# Patient Record
Sex: FEMALE | Race: OTHER | Employment: STUDENT | URBAN - METROPOLITAN AREA
[De-identification: names, ages, dates, MRNs, and addresses within clinical notes are randomized per-mention and may not be internally consistent; named-entity substitution may affect disease eponyms.]

---
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
## 2024-03-27 ENCOUNTER — TELEPHONE (OUTPATIENT)
Dept: FAMILY MEDICINE CLINIC | Age: 19
End: 2024-03-27

## 2024-03-28 ENCOUNTER — HOSPITAL ENCOUNTER (OUTPATIENT)
Dept: GENERAL RADIOLOGY | Age: 19
Discharge: HOME | End: 2024-03-28
Payer: COMMERCIAL

## 2024-03-28 ENCOUNTER — OFFICE VISIT (OUTPATIENT)
Dept: FAMILY MEDICINE CLINIC | Age: 19
End: 2024-03-28
Payer: COMMERCIAL

## 2024-03-28 VITALS
DIASTOLIC BLOOD PRESSURE: 78 MMHG | OXYGEN SATURATION: 98 % | SYSTOLIC BLOOD PRESSURE: 112 MMHG | WEIGHT: 220 LBS | HEART RATE: 96 BPM | BODY MASS INDEX: 36.61 KG/M2

## 2024-03-28 DIAGNOSIS — S93.401A SPRAIN OF RIGHT ANKLE, UNSPECIFIED LIGAMENT, INITIAL ENCOUNTER: ICD-10-CM

## 2024-03-28 DIAGNOSIS — M25.471 RIGHT ANKLE SWELLING: ICD-10-CM

## 2024-03-28 DIAGNOSIS — S93.401A SPRAIN OF RIGHT ANKLE, UNSPECIFIED LIGAMENT, INITIAL ENCOUNTER: Primary | ICD-10-CM

## 2024-03-28 PROCEDURE — 99213 OFFICE O/P EST LOW 20 MIN: CPT | Performed by: NURSE PRACTITIONER

## 2024-03-28 PROCEDURE — 73610 X-RAY EXAM OF ANKLE: CPT

## 2024-03-28 RX ORDER — IBUPROFEN 800 MG/1
800 TABLET ORAL EVERY 8 HOURS PRN
Qty: 21 TABLET | Refills: 0 | Status: SHIPPED | OUTPATIENT
Start: 2024-03-28 | End: 2024-04-04

## 2024-03-28 ASSESSMENT — PATIENT HEALTH QUESTIONNAIRE - PHQ9
1. LITTLE INTEREST OR PLEASURE IN DOING THINGS: NOT AT ALL
SUM OF ALL RESPONSES TO PHQ QUESTIONS 1-9: 0
SUM OF ALL RESPONSES TO PHQ QUESTIONS 1-9: 0
2. FEELING DOWN, DEPRESSED OR HOPELESS: NOT AT ALL
SUM OF ALL RESPONSES TO PHQ QUESTIONS 1-9: 0
DEPRESSION UNABLE TO ASSESS: FUNCTIONAL CAPACITY MOTIVATION LIMITS ACCURACY
SUM OF ALL RESPONSES TO PHQ9 QUESTIONS 1 & 2: 0
SUM OF ALL RESPONSES TO PHQ QUESTIONS 1-9: 0

## 2024-03-28 ASSESSMENT — ENCOUNTER SYMPTOMS: COLOR CHANGE: 0

## 2024-03-28 NOTE — PROGRESS NOTES
if symptoms worsen        Return if symptoms worsen or fail to improve.         Subjective   SUBJECTIVE/OBJECTIVE:  Reports symptoms for one day   Twisted right  ankle in driveway last night after stepping off edge of driveway   Has put ice on it and taken OTC pain medications   Reports today it swollen and painful   No bruising, numbness or tingling,   Difficulty with walking on it         Ankle Pain   Pertinent negatives include no numbness.       Review of Systems   Musculoskeletal:  Positive for arthralgias, gait problem and joint swelling.   Skin:  Negative for color change and rash.   Neurological:  Negative for numbness.          Objective   Physical Exam  Vitals reviewed.   Constitutional:       General: She is not in acute distress.     Appearance: She is not ill-appearing.   HENT:      Head: Normocephalic.      Mouth/Throat:      Mouth: Mucous membranes are moist.   Eyes:      Pupils: Pupils are equal, round, and reactive to light.   Pulmonary:      Effort: Pulmonary effort is normal.   Musculoskeletal:      Right ankle: Swelling and ecchymosis present. No deformity. Tenderness present over the lateral malleolus and medial malleolus. Decreased range of motion.      Right Achilles Tendon: No tenderness. Oswald's test negative.      Right foot: Normal capillary refill. No swelling, deformity, tenderness or bony tenderness. Normal pulse.        Legs:       Comments: Pain and obvious swelling to area indicated above   Tenderness to palpation.   Decreased ROM from pain with movement   Good pulses   Tender to palpation at LML, MML and talus area      Skin:     General: Skin is warm and dry.      Capillary Refill: Capillary refill takes less than 2 seconds.   Neurological:      General: No focal deficit present.      Mental Status: She is alert and oriented to person, place, and time.      Gait: Gait normal.                This note was generated completely or in part utilizing Dragon dictation speech

## 2024-03-28 NOTE — PATIENT INSTRUCTIONS
Ice  Be sure to put something between your skin and the ice. No longer than 15 minutes at at time   Tylenol/Ibuprofen as needed per package directions, take with food.   Rest and elevate the ankle   May immobilize with boot until seen by ortho  4.  Go to ER or ortho urgent care if symptoms worsen

## 2024-05-02 ENCOUNTER — TELEPHONE (OUTPATIENT)
Dept: FAMILY MEDICINE CLINIC | Age: 19
End: 2024-05-02
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
## 2024-05-02 NOTE — TELEPHONE ENCOUNTER
Patient will attempt to give job letter from 4/2/2024 and if needed she will schedule and appt to new note.

## 2024-10-17 ENCOUNTER — TELEPHONE (OUTPATIENT)
Dept: ORTHOPEDIC SURGERY | Age: 19
End: 2024-10-17

## 2024-10-17 NOTE — TELEPHONE ENCOUNTER
I called [facility] [phone]. They are not compatible with our PACS. I spoke to pt and let her know we will take new x-rays here at her appt. Her other [facility] records are in Care Everywhere.

## 2024-10-17 NOTE — TELEPHONE ENCOUNTER
General Question     Subject: REQUEST MEDICAL RECORDS  Patient and /or Facility Request: Raimundo Sykes   Contact Number: [phone]     PATIETN REQUEST WE REQUEST XR FROM [facility] IN [facility]  VIA PACS SYSTEM PATIENT WAS SEEN THERE 10/15-10/16 ADVISED PER HOSPITAL WE WOULD NEED TO CALL THEM TO REQUEST RECORDS HAS APPOINTMENT ON 10/23   IF THERE IS ANY PERMISSIONS THAT PATIENT NEEDS TO SIGN SHE IS CLOSE TO OFFICE AND SHE ALSO HAS MYCHART ANY QUESTIONS PLEASE CALL FATHER [phone]

## 2024-10-23 ENCOUNTER — OFFICE VISIT (OUTPATIENT)
Dept: ORTHOPEDIC SURGERY | Age: 19
End: 2024-10-23

## 2024-10-23 VITALS — WEIGHT: 237 LBS | BODY MASS INDEX: 39.49 KG/M2 | RESPIRATION RATE: 16 BRPM | HEIGHT: 65 IN

## 2024-10-23 DIAGNOSIS — T14.90XA INJURY: Primary | ICD-10-CM

## 2024-10-23 DIAGNOSIS — S52.92XA CLOSED FRACTURE OF LEFT RADIUS AND ULNA, INITIAL ENCOUNTER: ICD-10-CM

## 2024-10-23 DIAGNOSIS — S52.202A CLOSED FRACTURE OF LEFT RADIUS AND ULNA, INITIAL ENCOUNTER: ICD-10-CM

## 2024-10-23 PROBLEM — S52.502A CLOSED FRACTURE OF LEFT DISTAL RADIUS: Status: ACTIVE | Noted: 2024-10-23

## 2024-10-23 PROCEDURE — 99204 OFFICE O/P NEW MOD 45 MIN: CPT | Performed by: ORTHOPAEDIC SURGERY
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
## 2024-10-24 DIAGNOSIS — S52.92XA CLOSED FRACTURE OF LEFT RADIUS AND ULNA, INITIAL ENCOUNTER: Primary | ICD-10-CM

## 2024-10-24 DIAGNOSIS — S52.202A CLOSED FRACTURE OF LEFT RADIUS AND ULNA, INITIAL ENCOUNTER: Primary | ICD-10-CM

## 2024-10-24 RX ORDER — OXYCODONE AND ACETAMINOPHEN 5; 325 MG/1; MG/1
1 TABLET ORAL EVERY 6 HOURS PRN
Qty: 20 TABLET | Refills: 0 | Status: SHIPPED | OUTPATIENT
Start: 2024-10-24 | End: 2024-10-29

## 2024-10-24 NOTE — TELEPHONE ENCOUNTER
Patient last seen 10/23/24 by Martha    Requested Prescriptions     Pending Prescriptions Disp Refills    oxyCODONE-acetaminophen (PERCOCET) 5-325 MG per tablet 20 tablet 0     Sig: Take 1 tablet by mouth every 6 hours as needed for Pain for up to 5 days. Intended supply: 5 days. Take lowest dose possible to manage pain Max Daily Amount: 4 tablets

## 2024-10-24 NOTE — TELEPHONE ENCOUNTER
Other    PATIENT FATHER CALLING REGARDING RX     PATIENT FATHER CORONA CALLING REGARDING THE RX  STATED WILL SENT TO PHARMACY FOR THE PATIENT . FATHER STATED ORDER NOT SENT     SEND TO [facility]      RX :OXYCODONE AND TYLENOL SENT TO       PLEASE ADVISE